# Patient Record
Sex: MALE | Race: WHITE | NOT HISPANIC OR LATINO | Employment: UNEMPLOYED | URBAN - METROPOLITAN AREA
[De-identification: names, ages, dates, MRNs, and addresses within clinical notes are randomized per-mention and may not be internally consistent; named-entity substitution may affect disease eponyms.]

---

## 2018-11-15 ENCOUNTER — TELEPHONE (OUTPATIENT)
Dept: OTHER | Facility: CLINIC | Age: 38
End: 2018-11-15

## 2022-10-30 ENCOUNTER — HOSPITAL ENCOUNTER (EMERGENCY)
Facility: CLINIC | Age: 42
Discharge: HOME | End: 2022-10-30
Attending: EMERGENCY MEDICINE | Admitting: EMERGENCY MEDICINE
Payer: COMMERCIAL

## 2022-10-30 VITALS
BODY MASS INDEX: 27.28 KG/M2 | HEIGHT: 68 IN | WEIGHT: 180 LBS | HEART RATE: 123 BPM | OXYGEN SATURATION: 100 % | TEMPERATURE: 97 F | SYSTOLIC BLOOD PRESSURE: 167 MMHG | RESPIRATION RATE: 18 BRPM | DIASTOLIC BLOOD PRESSURE: 111 MMHG

## 2022-10-30 DIAGNOSIS — F41.9 ANXIETY: ICD-10-CM

## 2022-10-30 LAB
ALCOHOL BREATH TEST: 0.14 (ref 0–0.01)
AMPHETAMINES UR QL SCN: ABNORMAL
BARBITURATES UR QL SCN: ABNORMAL
BENZODIAZ UR QL SCN: ABNORMAL
BZE UR QL SCN: ABNORMAL
CANNABINOIDS UR QL SCN: ABNORMAL
OPIATES UR QL SCN: ABNORMAL
PCP QUAL URINE (ROCHE): ABNORMAL

## 2022-10-30 PROCEDURE — 80307 DRUG TEST PRSMV CHEM ANLYZR: CPT | Performed by: FAMILY MEDICINE

## 2022-10-30 PROCEDURE — 250N000011 HC RX IP 250 OP 636: Performed by: EMERGENCY MEDICINE

## 2022-10-30 PROCEDURE — 99285 EMERGENCY DEPT VISIT HI MDM: CPT | Mod: 25

## 2022-10-30 PROCEDURE — 250N000013 HC RX MED GY IP 250 OP 250 PS 637: Performed by: EMERGENCY MEDICINE

## 2022-10-30 PROCEDURE — 90791 PSYCH DIAGNOSTIC EVALUATION: CPT

## 2022-10-30 PROCEDURE — 99284 EMERGENCY DEPT VISIT MOD MDM: CPT | Performed by: EMERGENCY MEDICINE

## 2022-10-30 PROCEDURE — 96372 THER/PROPH/DIAG INJ SC/IM: CPT | Performed by: EMERGENCY MEDICINE

## 2022-10-30 RX ORDER — HALOPERIDOL 5 MG/ML
2 INJECTION INTRAMUSCULAR
Status: COMPLETED | OUTPATIENT
Start: 2022-10-30 | End: 2022-10-30

## 2022-10-30 RX ORDER — LORAZEPAM 1 MG/1
2 TABLET ORAL ONCE
Status: COMPLETED | OUTPATIENT
Start: 2022-10-30 | End: 2022-10-30

## 2022-10-30 RX ADMIN — LORAZEPAM 2 MG: 1 TABLET ORAL at 19:00

## 2022-10-30 RX ADMIN — HALOPERIDOL LACTATE 2 MG: 5 INJECTION, SOLUTION INTRAMUSCULAR at 19:22

## 2022-10-30 ASSESSMENT — ACTIVITIES OF DAILY LIVING (ADL)
ADLS_ACUITY_SCORE: 35

## 2022-10-30 NOTE — ED TRIAGE NOTES
"Arrived via [facility] 1622. ETOH use, just wants to \" talk to someone\". Has not taken meds today. Requesting mental health evaluation. ETOH. ; /92; 96% on RA     Triage Assessment     Row Name 10/30/22 3279       Triage Assessment (Adult)    Airway WDL WDL       Respiratory WDL    Respiratory WDL WDL       Skin Circulation/Temperature WDL    Skin Circulation/Temperature WDL WDL       Cardiac WDL    Cardiac WDL WDL       Peripheral/Neurovascular WDL    Peripheral Neurovascular WDL WDL       Cognitive/Neuro/Behavioral WDL    Cognitive/Neuro/Behavioral WDL WDL              "

## 2022-10-30 NOTE — ED PROVIDER NOTES
"  History     Chief Complaint   Patient presents with     Mental Health Problem     Arrived via [facility] 1622. ETOH use, just wants to \" talk to someone\". Has not taken meds today.      Alcohol Intoxication     Requesting mental health evaluation. ETOH. ; /92; 96% on RA     HPI  Juancarlos Loera is a 41 year old male who presents with alcohol intoxication.  The patient smells of alcohol on arrival.  He is also saying that his girlfriend tried to commit suicide over the weekend.  He says he is feeling like he is having a panic attack because of that.  On my arrival to speak with the patient he is trying to leave.  He smells of alcohol so we did not let him leave.  He was quite abusive to me into the nurses and I asked him to please stop this.  He is asking for alprazolam, which he says he needs to help him calm down.  He says he also needs his mood stabilizer.  I am not sure what this is.  He says he brought it with those taken on arrival.  He wandered around the ER attempted to abscond before being redirected back to his room.  He told triage that he was requesting a mental health evaluation.  He came here via EMS.  He is telling me that he is not suicidal.    Allergies:  No Known Allergies    Problem List:    There are no problems to display for this patient.       Past Medical History:    No past medical history on file.    Past Surgical History:    No past surgical history on file.    Family History:    No family history on file.    Social History:  Marital Status:  Single [1]        Medications:    No current outpatient medications on file.        Review of Systems   Unable to perform ROS: Other   Psychiatric/Behavioral: Negative for suicidal ideas.        Intoxication  Anxiety       Physical Exam   BP: (!) 167/111  Pulse: (!) 123  Temp: 97  F (36.1  C)  Resp: 18  Height: 172.7 cm (5' 8\")  Weight: 81.6 kg (180 lb)  SpO2: 100 %      Physical Exam  Vitals reviewed.   Constitutional:       " General: He is not in acute distress.     Appearance: He is not toxic-appearing.      Comments: Abusive   HENT:      Head: Normocephalic and atraumatic.      Right Ear: External ear normal.      Left Ear: External ear normal.      Nose: No congestion.      Mouth/Throat:      Pharynx: No oropharyngeal exudate.   Eyes:      General:         Right eye: No discharge.         Left eye: No discharge.      Extraocular Movements: Extraocular movements intact.   Pulmonary:      Effort: No respiratory distress.   Abdominal:      General: Abdomen is flat. There is no distension.   Musculoskeletal:         General: No swelling.      Cervical back: No rigidity.   Skin:     Capillary Refill: Capillary refill takes less than 2 seconds.      Coloration: Skin is not jaundiced.   Neurological:      General: No focal deficit present.      Cranial Nerves: No cranial nerve deficit.   Psychiatric:      Comments: Anxious  Denying SI         ED Course              ED Course as of 11/06/22 0130   Sun Oct 30, 2022   1855 Urine toxicology is positive for cannabinoids.   1935 The patient just threatened to assault me if he ever sees me at [facility].   2009 Lora from DEC called. This consult was placed apparently on arrival. He denied SI. Says he drinks once or two times a week. No previous suicide attempts. Would recommend reassessment once sober if patient is suicidal when sober, otherwise doesn't need it. Lora is putting together a safety plan. I shared my opinion that the patient's narcissism is likely protective.    2102 Breathalyzer shows SURI of 0.136   2257 Patient is now clinically sober. Is getting a ride home. Denies SI, HI, AVH. No tremors. Looks well.    2257 He would like to be discharged.    2258 I gave ER precautions. Refused a sandwich. I'm going to discharge him at this time. Walking normally, mentating normally.      Procedures                  No results found for this or any previous visit (from the past 24  hour(s)).    Medications   LORazepam (ATIVAN) tablet 2 mg (2 mg Oral Given 10/30/22 1900)   haloperidol lactate (HALDOL) injection 2 mg (2 mg Intramuscular Given 10/30/22 1922)       Assessments & Plan (with Medical Decision Making)     I am not totally sure what is going on exactly with this patient though he appears to be intoxicated.  Therefore I cannot let him leave.  I am going to keep him here until he is clinically sober.  I Kathy give him some Ativan to help him calm down.  This should also help stabilize his mood.  I will reassess him after he is clinically sober.  I will defer a mental health evaluation till he is clinically sober.    I have reviewed the nursing notes.    I have reviewed the findings, diagnosis, plan and need for follow up with the patient.    This note was in part created using dictation software in an effort to speed and improve patient care; any typos should be read with the frequent shortcomings of this technology in mind.      There are no discharge medications for this patient.      Final diagnoses:   Anxiety       10/30/2022   [facility] EMERGENCY DEPT     Joey Lemos MD  11/06/22 0130

## 2022-10-31 NOTE — CONSULTS
"Diagnostic Evaluation Consultation  Crisis Assessment    Patient was assessed: Wilman  Patient location: [facility]   Was a release of information signed: No. Reason: pt did not want to relay this information       Referral Data and Chief Complaint  Juancarlos is a 41 year old, who uses he/him pronouns, and presents to the ED via EMS. Patient is referred to the ED by nurse triage after he called with suicidal ideation. Patient is presenting to the ED for the following concerns:  Alcohol intoxication and suicidal ideation    Informed Consent and Assessment Methods    Patient is his own guardian. Writer met with patient and explained the crisis assessment process, including applicable information disclosures and limits to confidentiality, assessed understanding of the process, and obtained consent to proceed with the assessment. Patient was observed to be able to participate in the assessment as evidenced by answer most of my questions. Assessment methods included conducting a formal interview with patient, review of medical records, collaboration with medical staff, and obtaining relevant collateral information from family and community providers when available..     Over the course of this crisis assessment provided reassurance, offered validation and assisted in processing patient's thoughts and feeling relating to break up with girlfriend. Patient's response to interventions was: irritable, begrudgingly answered questions.      Summary of Patient Situation  Pt is a 42 yo male who presents with alcohol intoxication.  He called a nurse triage line today saying he needed a mental health evaluation and \"wanted to check himself in.\"  He was saying things that made them concerned for his safety so they did a well check.  EMS brought him in to the ED.  Upon arrival he smelled of alcohol and was combative and verbally abusive to hospital staff. He told the ED MD that he was going to find him at [facility] and " "kill him.  He attempted to leave the hospital many times so he was placed on a hold.  ED provider reports that the pt told him he was having a panic attack as well.     When asked why he came into the hospital he said, \"I called 911\" and \"I felt unsafe.\"  Writer told him I was glad that he reached out for help.  He is telling writer that he is not suicidal and that he was \"just drunk.\"  He said he \"just wants to go to sleep and break up with his girlfriend and he'll be fine.\"      Pt does still seem intoxicated and he was not willing to do a UA to determine what level of intoxication he is at.  He is often laying down facing away from writer, irritated with line of questions. He asked the interview to be over multiple times.  He often sighed and groaned in annoyance when I would ask a question.  His words were somewhat slurred and he did not want to engage with writer.    Brief Psychosocial History  Pt reports that he lives alone currently.  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  He has 5 children ages, 17yo, 14yo, 7yo, 5yo and 6yo.  He said none of them reside with him but with their mothers.  Pt says he and his girlfriend have been fighting lately and he just wants to break up with her.     Significant Clinical History  Pt denies any hx of MH admissions or detox.  He denies any hx of suicide attempts.  He does see a therapist at [facility].  He says he has seen her for 2 years.  No other MH providers.  He reports that he has a girlfriend, but that things are not going well between them.  He reports that she attempted suicide this weekend. He got drunk today and didn't know how to cope with his emotions and was having a panic attack.      Collateral Information  Pt was not cooperative. Pt did not want anyone contacted.  He had no emergency contacts listed in his chart.   Collateral provided by ED provider.      Risk Assessment  ESS-6  1.a. Over the past 2 weeks, have you had thoughts of killing " "yourself? Yes  1.b. Have you ever attempted to kill yourself and, if yes, when did this last happen? No   2. Recent or current suicide plan? No   3. Recent or current intent to act on ideation? No  4. Lifetime psychiatric hospitalization? No  5. Pattern of excessive substance use? Yes  6. Current irritability, agitation, or aggression? Yes  Scoring note: BOTH 1a and 1b must be yes for it to score 1 point, if both are not yes it is zero. All others are 1 point per number. If all questions 1a/1b - 6 are no, risk is negligible. If one of 1a/1b is yes, then risk is mild. If either question 2 or 3, but not both, is yes, then risk is automatically moderate regardless of total score. If both 2 and 3 are yes, risk is automatically high regardless of total score.      Score: 2, mild risk      Does the patient have access to lethal means? Pt did not answer 'none of my business'     Does the patient engage in non-suicidal self-injurious behavior (NSSI/SIB)? no     Does the patient have thoughts of harming others? No     Is the patient engaging in sexually inappropriate behavior?  no        Current Substance Abuse     Is there recent substance abuse? Pt was intoxicated today and said he \"drank 5 beers.\"  Would not provider urine for BAL.     Was a urine drug screen or blood alcohol level obtained: No       Mental Status Exam     Affect: Dramatic and Labile   Appearance: Disheveled    Attention Span/Concentration: Inattentive  Eye Contact: Avoidant   Fund of Knowledge: Other: hard to assess as he was not cooperative    Language /Speech Content: irritable, annoyed   Language /Speech Volume: Loud    Language /Speech Rate/Productions: Normal    Recent Memory: Variable   Remote Memory: Variable   Mood: Angry and Irritable    Orientation to Person: Yes    Orientation to Place: Yes   Orientation to Time of Day: Yes    Orientation to Date: Yes    Situation (Do they understand why they are here?): Yes    Psychomotor Behavior: moving " around a lot on bed, faced his back to me multiple times as he did not want to engage     Thought Content: Clear and Suicidal   Thought Form: Intact      History of commitment: No    Medication    Psychotropic medications: none  Medication changes made in the last two weeks: no       Current Care Team    Primary Care Provider: None  Psychiatrist: None  Therapist: [facility]  : No     CTSS or [facility]HS: None  ACT Team: None  Other: None      Diagnosis    Substance-Related & Addictive Disorders Alcohol Intoxication  303.00 (F10.929) Alcohol Intoxication without use disorder   300.00 (F41.9) Unspecified Anxiety Disorder - primary       Clinical Summary and Substantiation of Recommendations    Pt presents due to alcohol intoxication after calling a nurse triage line.  Upon arrival he is combative, irritable and mostly uncooperative.  He is sobering up and now denying suicidal ideation. However, at the time of the assessment writer spoke to ED provider and relayed that it would be best for this patient to have additional time to sober before final decision is made for discharge. He will likely be able to discharge home with outpatient follow-up, but due to his intoxication and inability to completely engage in assessment writer recommended a reassessment in a few hours.  Then the  can further safety plan with him and work out what is best for final decision. ED provider agrees.  He will see if another assessment is needed upon sobering.   Pt was alert and oriented. Casually groomed, disheveled.  He was laying on the bed but many times turned with his back to writer.  He did not want to do the interview and was irritable with the questions.  Asking when it can end multiple times.  He had avoidant eye contact. Somewhat slurred speech. Mood was depressed, and affect was irritable.  Overall thought process was clear. He denies current SI.  He denies any plan or intent for  suicide.  Denies HI and SIB. No psychosis noted. No hallucinations. No delusions and no paranoia.  Insight was poor. Judgment was poor.   Recommendation is for pt to sober and reassessment be completed.  Disposition    Recommended disposition: TBD       Reviewed case and recommendations with attending provider. Attending Name: Joey Lemos MD     Attending concurs with disposition: yes for reassessment upon sobering or if MD feels discharge is appropriate once sober that would likely be okay as well as pt is not endorsing SI       Patient concurs with disposition: NA       Guardian concurs with disposition: NA      Final disposition: Other: reassessment upon sobering.     Outpatient Details (if applicable):   Aftercare plan and appointments placed in the AVS and provided to patient: Provided resources of Rule 25 in area, follow-up with his OP therapist as well     Was lethal means counseling provided as a part of aftercare planning? No;       Assessment Details    Patient interview started at: 7:45pm and completed at: 8pm .     Total duration spent on the patient case in minutes: 1.50 hrs      CPT code(s) utilized: 56366 - Psychotherapy for Crisis - 60 (30-74*) min       TAMEKA Pandey, DONAC,LM  [facility] - Triage & Transition Services  Callback: [phone]

## 2022-10-31 NOTE — DISCHARGE INSTRUCTIONS
Recommendation and Safety Plan for: Juancarlos Loera  : 1980    Recommendation for Follow-up care:  Recommendation for care is for you to follow-up with your outpatient therapist on a weekly basis.  You are followed by a provider at [facility].  We recommend you see her this week.    We also recommend a chemical dependency assessment if you continue to struggle with your alcohol intake.  [facility] has Rule 25/CD assessments available.  Please see below.     [facility]  We provide connections to services and resources that will put you on a path to healing. Contact us for help in crisis, to get an assessment, or to access ongoing mental health or chemical health services.    - Crisis services    Walk-in crisis services  [address] [place] [address]  Monday-Friday (8 a.m.-7 p.m.)  Saturday (11 a.m.-3 p.m.)   Detox Center    Substance use disorders are a chronic disease that results from biological, social and environmental factors. Addiction can be successfully treated. The [facility] Detoxification Center is a place that helps individuals safely manage symptoms of intoxication or withdrawal for drugs and alcohol, whilel providing counseling and support services that treat the whole person, not just their substance use. The Detox Center is not a dention center, correctional facility, or law enforcement center.    [address] [place] [address]    [phone]    The Chemical Health Unit provides drug and alcohol assessments for [facility] residents. The unit also is an access point for state-licensed, chemical dependency services. After the assessment, the Chemical Health Unit may refer people who are chemically dependent to group residential housing or other community programs.    To qualify for financial assistance to attend a chemical dependency program, a person must meet state income guidelines and clinical eligibility  guidelines. For more information, call [phone].    ---It is your responsibility to contact your insurance company directly to verify coverage, eligibility, payment, and benefit information for any appointments or referrals listed above.     *A BHP (Behavioral Healthcare Provider) Coordinator will be reaching out to you with 24-48 hours to assist with additional appointments if needed.      Please call BHP at [phone] for additional scheduling assistance.      We discussed a safety plan that will work for you as well. Here are additional resources:     Safety Plan      *If I Am Having Difficulty Or Am In A Crisis, I Will:       ? Contact my established care providers       ? Call Suicide Hotline (2-953-173-DXLX)       ? Go to the nearest Emergency Department       ? Call 9-1-1       Warnings Signs That A Crisis May Be Developing:     I am having suicidal thoughts that turn to plans  I am having urges to self-harm  My emotions are of hopelessness; feeling like there's no way out  Rage or anger  Engaging in risky activities without thinking  Withdrawing from family/friends  Dramatic mood swings  Drastic personality changes   Use of alcohol or drugs  Postings on social media  Neglect of personal hygiene or cares      Things That Make Me Feel Better:            Family, music, friends, exercise, relaxation breathing  Physical location,   safe people,   imagery etc.     Phone Apps that can be helpful!!   My3  https://myEmulispp.org/  VirtualHopeBox  https://InRoom Broadcasting.org/apps/virtual-hope-box/  Calm Harm (michael to assist with preventing self-injury)  Suicide Safety Plan LLC - https://www.suicidesafetyplan.michael/      Mindshift CBT Michael       Things I am able to do on my own to cope or help me feel better: Grounding Techniques:  Try to notice where you are, your surroundings including the people, the sounds like the TV or radio.  Concentrate on your breathing. Take a deep cleansing breath from your diaphragm. Count the  breaths as you exhale. Make sure you breath slowly.  Hold something that you find comforting, for some it may be a stuffed animal or a blanket. Notice how it feels in your hands. Is it hard or soft?  During a non-crisis time make a list of positive affirmations. Print them out and keep them handy for times of intense anxiety. At those times, read them aloud.  Try the Money360 game:  Name 5 things you can see in the room with you  Name 4 things you can feel ( chair on my back  or  feet on floor )   Name 3 things you can hear right now ( people talking  or  tv )   Name 2 things you can smell right now (or, 2 things you like the smell of)   Name 1 good thing about yourself  Create A Safe Place  Image a safe place -- it can be a real or imaginary place:   What do you see -- especially colors?   What sounds do you hear?   What sensations do you feel?   What smells do you smell?   What people or animals would you want in your safe place?   Imagine a protective bubble, wall or boundary around your safe place.   Imagine a door or gate with a guard at your safe place.   Image a lock and key to your safe place and only you can unlock it.  You can draw or make a collage that represents your safe place.   Choose a souvenir of your safe place -- a color, an object, a song.   Keep your image of your safe place so you can come back to it when you need to.     Things I can use or do for distraction: Reduce Extreme Emotion  QUICKLY:  Changing Your Body Chemistry      T:  Change your body Temperature to change your autonomic nervous system   Use Ice Water to calm yourself down FAST   Put your face in a bowl of ice water (this is the best way; have the person keep his/her face in ice water for 30-45 seconds - initial research is showing that the longer s/he can hold her/his face in the water, the better the response), or   Splash ice water on your face, or hold an ice pack on your face      I:  Intensely exercise to calm down a body  revved up by emotion   Examples: running, walking fast, jumping, playing basketball, weight lifting, swimming, calisthenics, etc.   Engage in exercises that DO NOT include violent behaviors. Exercises that utilize violent behaviors tend to function as  behavioral rehearsal,  and rather than calming the person down, may actually  rev  the person up more, increasing the likelihood of violence, and lessening the likelihood that they will  burn off  energy     P:  Progressively relax your muscles   Starting with your hands, moving to your forearms, upper arms, shoulders, neck, forehead, eyes, cheeks and lips, tongue and teeth, chest, upper back, stomach, buttocks, thighs, calves, ankles, feet   Tense (10 seconds,   of the way), then relax each muscle (all the way)   Notice the tension   Notice the difference when relaxed (by tensing first, and then relaxing, you are able to get a more thorough relaxation than by simply relaxing)      P: Paced breathing to relax   The standard technique is to begin with counting the number of steps one takes for a typical inhale, then counting the steps one takes for a typical exhale, and then lengthening the amount of steps for the exhalation by one or two steps.  OR  Repeat this pattern for 1-2 minutes  Inhale for four (4) seconds   Exhale for six (6) to eight (8) seconds   Research demonstrated that one can change one's overall level of anxiety by doing this exercise for even a few minutes per day        Changes I can make to support my mental health and wellness: refrain from drinking     People in my life that I can ask for help: family, friends, my therapist      Your [facility] has a mental health crisis team you can call 24/7:   - [facility] Crisis  Crisis Line  If you are in crisis, please call the 24/7/365 crisis line at [phone].    - [facility]  [address] [place], [place] [address]  [phone]    Monday - Friday   8 a.m. - 5:30  "p.m.  Closed: Saturdays, Sundays and county holidaysB    Getting There   Visit [employer] s website or call [phone] to find connections to the #64 bus, which stops at the corner of [facility] and [facility], just two blocks east of [facility].    Building Policies   Walk-ins welcome    Additional Information   [facility] provides service to adults who are experiencing a mental health crisis.    Learn more about adult mental health and chemical health services in [facility], including how to access services and community resources.        Other things that are important when I'm in crisis:   People and Social Settings That Provide Distraction:      My friends and family  Exercising  Listening to music  Reading a book  Watching a movie  Meditating  Calling a friend    [facility]   COPE: [facility] mental health emergencies- [phone] - They have mobile crisis teams that can come where you are.  The teams respond to anyone in the [facility] who needs an urgent response.      Texting crisis line - text [place] to 261204 - 24 hours a day 7 days a week     Fast Tracker  Linking people to mental health and substance use disorder resources  www.Solidarium.org      [place] Mental Health Warm Line  Peer to peer support  Monday thru Saturday, 12 pm to 10 pm  206.110.9641 or 3.874.403.2402  Text \"Support\" to 02844     National [place] on Mental Illness (LAVERN)  628.032.3031 or 1.888.LAVERN.HELPS      *Panic Attack resources  --Here are some additional resources---  https://adaa.org/understanding-anxiety/panic-disorder/resources  https://projectlets.org/emergency-action-for-panic-attacks    *Progressive Muscle Relaxation  Https://www.youtube.com/watch?v=ClqPtWzozXs    Crisis Lines  Crisis Text Line  Text 067864  You will be connected with a trained live crisis counselor to provide support.    Por darius, rosaso  KALLIE a 194410 o texto a " "442-ASTRID Patino    [employer] [facility] (LGBTQ Youth Crisis Line)  4.749.475.9999  text START to 631-509    Community Resources  Fast Tracker  Linking people to mental health and substance use disorder resources  PurpleTeal.Omni-ID     [place] Mental Health Warm Line  Peer to peer support  Monday thru Saturday, 12 pm to 10 pm  639.614.2914 or 2.474.371.1555  Text \"Support\" to 82482    National [place] on Mental Illness (LAVERN)  964.947.6807 or 1.888.LAVERN.HELPS    Additional information  Today you were seen by a licensed mental health professional through Triage and Transition services, Behavioral Healthcare Providers ([facility])  for a crisis assessment in the Emergency Department at [facility].  It is recommended that you follow up with your established providers (psychiatrist, mental health therapist, and/or primary care doctor - as relevant) as soon as possible. Coordinators from [facility] will be calling you in the next 24-48 hours to ensure that you have the resources you need.  You can also contact [facility] coordinators directly at [phone].     You may have been scheduled for or offered an appointment with a mental health provider. [facility] maintains an extensive network of licensed behavioral health providers to connect patients with the services they need.  We do not charge providers a fee to participate in our referral network.  We match patients with providers based on a patient's specific needs, insurance coverage, and location.  Our first effort will be to refer you to a provider within your care system, and will utilize providers outside your care system as needed.             "

## 2022-10-31 NOTE — ED NOTES
Patient's girlfriend updated on patient status. Girlfriend will pick patient up when he is able to discharge.

## 2022-10-31 NOTE — ED NOTES
Pt attempted to leave the department. MD at [facility], security at bedside. Pt is slurring speech, angry and swearing at staff.

## 2022-10-31 NOTE — ED NOTES
"With security remaining at bedside, patient stated that he \" wants to shoot my brains out\" Belongings searched, vap pen removed from patient. Health officer hold paperwork given to patient. Cameras remain in place.   "

## 2022-11-11 ENCOUNTER — HOSPITAL ENCOUNTER (EMERGENCY)
Facility: CLINIC | Age: 42
Discharge: HOME | End: 2022-11-11
Attending: FAMILY MEDICINE | Admitting: FAMILY MEDICINE
Payer: COMMERCIAL

## 2022-11-11 ENCOUNTER — APPOINTMENT (OUTPATIENT)
Dept: GENERAL RADIOLOGY | Facility: CLINIC | Age: 42
End: 2022-11-11
Attending: FAMILY MEDICINE
Payer: COMMERCIAL

## 2022-11-11 VITALS
DIASTOLIC BLOOD PRESSURE: 99 MMHG | SYSTOLIC BLOOD PRESSURE: 147 MMHG | OXYGEN SATURATION: 97 % | HEIGHT: 68 IN | WEIGHT: 177 LBS | TEMPERATURE: 98.6 F | RESPIRATION RATE: 18 BRPM | BODY MASS INDEX: 26.83 KG/M2 | HEART RATE: 82 BPM

## 2022-11-11 DIAGNOSIS — R07.81 PLEURITIC CHEST PAIN: ICD-10-CM

## 2022-11-11 DIAGNOSIS — R03.0 ELEVATED BP WITHOUT DIAGNOSIS OF HYPERTENSION: ICD-10-CM

## 2022-11-11 PROBLEM — J45.20 MILD INTERMITTENT ASTHMA WITHOUT COMPLICATION: Status: ACTIVE | Noted: 2021-05-05

## 2022-11-11 LAB
ALBUMIN SERPL BCG-MCNC: 5 G/DL (ref 3.5–5.2)
ALP SERPL-CCNC: 44 U/L (ref 40–129)
ALT SERPL W P-5'-P-CCNC: 14 U/L (ref 10–50)
ANION GAP SERPL CALCULATED.3IONS-SCNC: 13 MMOL/L (ref 7–15)
AST SERPL W P-5'-P-CCNC: 33 U/L (ref 10–50)
BASOPHILS # BLD AUTO: 0.1 10E3/UL (ref 0–0.2)
BASOPHILS NFR BLD AUTO: 1 %
BILIRUB DIRECT SERPL-MCNC: <0.2 MG/DL (ref 0–0.3)
BILIRUB SERPL-MCNC: 0.8 MG/DL
BUN SERPL-MCNC: 10.6 MG/DL (ref 6–20)
CALCIUM SERPL-MCNC: 9.9 MG/DL (ref 8.6–10)
CHLORIDE SERPL-SCNC: 100 MMOL/L (ref 98–107)
CREAT SERPL-MCNC: 1.02 MG/DL (ref 0.67–1.17)
D DIMER PPP FEU-MCNC: 0.46 UG/ML FEU (ref 0–0.5)
DEPRECATED HCO3 PLAS-SCNC: 27 MMOL/L (ref 22–29)
EOSINOPHIL # BLD AUTO: 0.1 10E3/UL (ref 0–0.7)
EOSINOPHIL NFR BLD AUTO: 2 %
ERYTHROCYTE [DISTWIDTH] IN BLOOD BY AUTOMATED COUNT: 12.7 % (ref 10–15)
FLUAV RNA SPEC QL NAA+PROBE: NEGATIVE
FLUBV RNA RESP QL NAA+PROBE: NEGATIVE
GFR SERPL CREATININE-BSD FRML MDRD: >90 ML/MIN/1.73M2
GLUCOSE SERPL-MCNC: 105 MG/DL (ref 70–99)
HCT VFR BLD AUTO: 50.2 % (ref 40–53)
HGB BLD-MCNC: 17.2 G/DL (ref 13.3–17.7)
HOLD SPECIMEN: NORMAL
IMM GRANULOCYTES # BLD: 0 10E3/UL
IMM GRANULOCYTES NFR BLD: 0 %
LIPASE SERPL-CCNC: 40 U/L (ref 13–60)
LYMPHOCYTES # BLD AUTO: 1.6 10E3/UL (ref 0.8–5.3)
LYMPHOCYTES NFR BLD AUTO: 21 %
MCH RBC QN AUTO: 31 PG (ref 26.5–33)
MCHC RBC AUTO-ENTMCNC: 34.3 G/DL (ref 31.5–36.5)
MCV RBC AUTO: 91 FL (ref 78–100)
MONOCYTES # BLD AUTO: 1.1 10E3/UL (ref 0–1.3)
MONOCYTES NFR BLD AUTO: 15 %
NEUTROPHILS # BLD AUTO: 4.7 10E3/UL (ref 1.6–8.3)
NEUTROPHILS NFR BLD AUTO: 61 %
NRBC # BLD AUTO: 0 10E3/UL
NRBC BLD AUTO-RTO: 0 /100
PLATELET # BLD AUTO: 321 10E3/UL (ref 150–450)
POTASSIUM SERPL-SCNC: 4.3 MMOL/L (ref 3.4–5.3)
PROT SERPL-MCNC: 8 G/DL (ref 6.4–8.3)
RBC # BLD AUTO: 5.54 10E6/UL (ref 4.4–5.9)
SARS-COV-2 RNA RESP QL NAA+PROBE: NEGATIVE
SODIUM SERPL-SCNC: 140 MMOL/L (ref 136–145)
WBC # BLD AUTO: 7.7 10E3/UL (ref 4–11)

## 2022-11-11 PROCEDURE — 83690 ASSAY OF LIPASE: CPT | Performed by: FAMILY MEDICINE

## 2022-11-11 PROCEDURE — 71046 X-RAY EXAM CHEST 2 VIEWS: CPT

## 2022-11-11 PROCEDURE — 99285 EMERGENCY DEPT VISIT HI MDM: CPT | Mod: CS,25 | Performed by: FAMILY MEDICINE

## 2022-11-11 PROCEDURE — 87636 SARSCOV2 & INF A&B AMP PRB: CPT | Performed by: FAMILY MEDICINE

## 2022-11-11 PROCEDURE — 85379 FIBRIN DEGRADATION QUANT: CPT | Performed by: FAMILY MEDICINE

## 2022-11-11 PROCEDURE — 93005 ELECTROCARDIOGRAM TRACING: CPT | Performed by: FAMILY MEDICINE

## 2022-11-11 PROCEDURE — 93010 ELECTROCARDIOGRAM REPORT: CPT | Performed by: FAMILY MEDICINE

## 2022-11-11 PROCEDURE — 250N000011 HC RX IP 250 OP 636: Performed by: FAMILY MEDICINE

## 2022-11-11 PROCEDURE — 36415 COLL VENOUS BLD VENIPUNCTURE: CPT | Performed by: FAMILY MEDICINE

## 2022-11-11 PROCEDURE — 80053 COMPREHEN METABOLIC PANEL: CPT | Performed by: FAMILY MEDICINE

## 2022-11-11 PROCEDURE — 258N000003 HC RX IP 258 OP 636: Performed by: FAMILY MEDICINE

## 2022-11-11 PROCEDURE — 96374 THER/PROPH/DIAG INJ IV PUSH: CPT | Performed by: FAMILY MEDICINE

## 2022-11-11 PROCEDURE — 85025 COMPLETE CBC W/AUTO DIFF WBC: CPT | Performed by: FAMILY MEDICINE

## 2022-11-11 PROCEDURE — 82248 BILIRUBIN DIRECT: CPT | Performed by: FAMILY MEDICINE

## 2022-11-11 PROCEDURE — C9803 HOPD COVID-19 SPEC COLLECT: HCPCS | Performed by: FAMILY MEDICINE

## 2022-11-11 PROCEDURE — 99284 EMERGENCY DEPT VISIT MOD MDM: CPT | Mod: CS | Performed by: FAMILY MEDICINE

## 2022-11-11 PROCEDURE — 96361 HYDRATE IV INFUSION ADD-ON: CPT | Performed by: FAMILY MEDICINE

## 2022-11-11 RX ORDER — FLUTICASONE PROPIONATE 50 MCG
2 SPRAY, SUSPENSION (ML) NASAL
COMMUNITY
Start: 2022-02-02

## 2022-11-11 RX ORDER — ALBUTEROL SULFATE 90 UG/1
1-2 AEROSOL, METERED RESPIRATORY (INHALATION)
COMMUNITY
Start: 2022-03-18

## 2022-11-11 RX ORDER — GABAPENTIN 100 MG/1
CAPSULE ORAL
COMMUNITY
Start: 2022-06-29

## 2022-11-11 RX ORDER — KETOROLAC TROMETHAMINE 15 MG/ML
15 INJECTION, SOLUTION INTRAMUSCULAR; INTRAVENOUS ONCE
Status: COMPLETED | OUTPATIENT
Start: 2022-11-11 | End: 2022-11-11

## 2022-11-11 RX ORDER — LAMOTRIGINE 100 MG/1
TABLET ORAL
COMMUNITY
Start: 2021-12-14

## 2022-11-11 RX ORDER — ALPRAZOLAM 1 MG
TABLET ORAL
COMMUNITY
Start: 2022-02-16

## 2022-11-11 RX ORDER — QUETIAPINE FUMARATE 50 MG/1
50 TABLET, FILM COATED ORAL AT BEDTIME
COMMUNITY
Start: 2022-10-29

## 2022-11-11 RX ADMIN — SODIUM CHLORIDE 500 ML: 9 INJECTION, SOLUTION INTRAVENOUS at 15:48

## 2022-11-11 RX ADMIN — KETOROLAC TROMETHAMINE 15 MG: 15 INJECTION, SOLUTION INTRAMUSCULAR; INTRAVENOUS at 18:26

## 2022-11-11 ASSESSMENT — ACTIVITIES OF DAILY LIVING (ADL)
ADLS_ACUITY_SCORE: 35
ADLS_ACUITY_SCORE: 35

## 2022-11-11 NOTE — ED PROVIDER NOTES
"  History     Chief Complaint   Patient presents with     Cough     Last week had a cough and right sided chest pain with deep breath     Vomiting     Vomiting started on Wednesday      HPI  Juancarlos Loera is a 41 year old male who presents with a history of vomiting and cough right-sided chest pain with deep breathing.  No obvious VTE risk.  Has been having vomiting nonbilious nonbloody.  Also was experiencing a cough that was nonproductive.  No left-sided chest pain.  No exertional pain.  No trauma.  No blood in the stool or black tarry stools.  No associated fever.  Does have superimposed anxiety as well.      Allergies:  No Known Allergies    Problem List:    There are no problems to display for this patient.       Past Medical History:    No past medical history on file.    Past Surgical History:    No past surgical history on file.    Family History:    No family history on file.    Social History:  Marital Status:  Single [1]        Medications:    No current outpatient medications on file.        Review of Systems    ROS:  5 point ROS negative except as noted above in HPI, including Gen., Resp., CV, GI &  system review.    Physical Exam   BP: (!) 148/105  Pulse: 109  Temp: 98.6  F (37  C)  Resp: 18  Height: 172.7 cm (5' 8\")  Weight: 80.3 kg (177 lb) (stated)  SpO2: 96 %      Physical Exam  Constitutional:       General: He is in acute distress.      Appearance: He is not diaphoretic.   HENT:      Head: Atraumatic.   Eyes:      Conjunctiva/sclera: Conjunctivae normal.   Cardiovascular:      Rate and Rhythm: Normal rate and regular rhythm.      Heart sounds: No murmur heard.  Pulmonary:      Effort: No respiratory distress.      Breath sounds: No stridor. No wheezing or rhonchi.   Chest:      Chest wall: No tenderness.   Abdominal:      General: Abdomen is flat. There is no distension.      Palpations: There is no mass.      Tenderness: There is no abdominal tenderness. There is no guarding. "   Musculoskeletal:      Right lower leg: No edema.      Left lower leg: No edema.   Skin:     Coloration: Skin is not pale.      Findings: No rash.   Neurological:      Mental Status: He is alert.      Motor: No weakness.           ED Course                 Procedures                EKG Interpretation:      Interpreted by Que Lenz MD  EKG done at 1802 hrs. demonstrates a sinus rhythm 72 bpm normal axis.  There is no ST change.  No T wave changes.  Normal R progression no Q waves.  Normal intervals.  Normal conduction.  No ectopy.  Impression sinus rhythm 72 bpm no acute change  none  Critical Care time:  none               Results for orders placed or performed during the hospital encounter of 11/11/22 (from the past 24 hour(s))   [place] Draw    Narrative    The following orders were created for panel order [place] Draw.  Procedure                               Abnormality         Status                     ---------                               -----------         ------                     Extra Blue Top Tube[[id number]]                              In process                 Extra Red Top Tube[[id number]]                               In process                 Extra Green Top (Lithium...[[id number]]                      In process                 Extra Purple Top Tube[[id number]]                            In process                   Please view results for these tests on the individual orders.   Symptomatic; Yes; 11/7/2022 Influenza A/B & SARS-CoV2 (COVID-19) Virus PCR Multiplex Nose    Specimen: Nose; Swab   Result Value Ref Range    Influenza A PCR Negative Negative    Influenza B PCR Negative Negative    SARS CoV2 PCR Negative Negative    Narrative    Testing was performed using the camille SARS-CoV-2 & Influenza A/B Assay on the camille Zaida System. This test should be ordered for the detection of SARS-CoV-2 and influenza viruses in individuals who meet clinical and/or epidemiological criteria. Test  performance is unknown in asymptomatic patients. This test is for in vitro diagnostic use under the FDA EUA for laboratories certified under CLIA to perform moderate and/or high complexity testing. This test has not been FDA cleared or approved. A negative result does not rule out the presence of PCR inhibitors in the specimen or target RNA in concentration below the limit of detection for the assay. If only one viral target is positive but coinfection with multiple targets is suspected, the sample should be re-tested with another FDA cleared, approved or authorized test, if coinfection would change clinical management. [facility] are certified under the Clinical Laboratory Improvement Amendments of 1988 (CLIA-88) as qualified to perform moderate and/or high complexity laboratory testing.   Basic metabolic panel   Result Value Ref Range    Sodium 140 136 - 145 mmol/L    Potassium 4.3 3.4 - 5.3 mmol/L    Chloride 100 98 - 107 mmol/L    Carbon Dioxide (CO2) 27 22 - 29 mmol/L    Anion Gap 13 7 - 15 mmol/L    Urea Nitrogen 10.6 6.0 - 20.0 mg/dL    Creatinine 1.02 0.67 - 1.17 mg/dL    Calcium 9.9 8.6 - 10.0 mg/dL    Glucose 105 (H) 70 - 99 mg/dL    GFR Estimate >90 >60 mL/min/1.73m2   CBC with platelets, differential    Narrative    The following orders were created for panel order CBC with platelets, differential.  Procedure                               Abnormality         Status                     ---------                               -----------         ------                     CBC with platelets and d...[[id number]]                      Final result                 Please view results for these tests on the individual orders.   CBC with platelets and differential   Result Value Ref Range    WBC Count 7.7 4.0 - 11.0 10e3/uL    RBC Count 5.54 4.40 - 5.90 10e6/uL    Hemoglobin 17.2 13.3 - 17.7 g/dL    Hematocrit 50.2 40.0 - 53.0 %    MCV 91 78 - 100 fL    MCH 31.0 26.5 - 33.0 pg    MCHC 34.3  31.5 - 36.5 g/dL    RDW 12.7 10.0 - 15.0 %    Platelet Count 321 150 - 450 10e3/uL    % Neutrophils 61 %    % Lymphocytes 21 %    % Monocytes 15 %    % Eosinophils 2 %    % Basophils 1 %    % Immature Granulocytes 0 %    NRBCs per 100 WBC 0 <1 /100    Absolute Neutrophils 4.7 1.6 - 8.3 10e3/uL    Absolute Lymphocytes 1.6 0.8 - 5.3 10e3/uL    Absolute Monocytes 1.1 0.0 - 1.3 10e3/uL    Absolute Eosinophils 0.1 0.0 - 0.7 10e3/uL    Absolute Basophils 0.1 0.0 - 0.2 10e3/uL    Absolute Immature Granulocytes 0.0 <=0.4 10e3/uL    Absolute NRBCs 0.0 10e3/uL       Medications   0.9% sodium chloride BOLUS (500 mLs Intravenous New Bag 11/11/22 1785)       Assessments & Plan (with Medical Decision Making)     MDM; Juancarlosshawanda Loera is a 41 year old male who presents with cough and pleuritic chest pain vomiting periodically.  Some reflux heartburn type symptoms as well.  No bloody emesis.  No blood in the stool black tarry stools.  No VTE risk.  No exertional chest pain.  No left-sided chest pain.  Normal EKG D-dimer chest x-ray laboratory testing.  Discussed empiric management below.  Recommended follow-up with primary doctor.  Precautions given for return      I have reviewed the nursing notes.    I have reviewed the findings, diagnosis, plan and need for follow up with the patient.       New Prescriptions    No medications on file       Final diagnoses:   Pleuritic chest pain - no serious findings. no signs  of pneumonia or pulmonary embolism.  no rash.  take ibuprofen or tylenol for pain stay hydrated.  if you have GERD symptoms you could take prilsoec 20 mg orally for 4 weeks, no food 2 hours bfeore bed, 64 oz fluid per day.  avoid caffeine, alcohol.  follow-up  primary provider   Elevated BP without diagnosis of hypertension - recheck BP in clinic       11/11/2022   [facility] EMERGENCY DEPT     Que Lenz MD  11/11/22 2607

## 2022-11-12 NOTE — DISCHARGE INSTRUCTIONS
ICD-10-CM    1. Pleuritic chest pain  R07.81     no serious findings. no signs  of pneumonia or pulmonary embolism.  no rash.  take ibuprofen or tylenol for pain stay hydrated.  if you have GERD symptoms you could take prilsoec 20 mg orally for 4 weeks, no food 2 hours bfeore bed, 64 oz fluid per day.  avoid caffeine, alcohol.  follow-up  primary provider      2. Elevated BP without diagnosis of hypertension  R03.0     recheck BP in clinic

## 2023-03-11 ENCOUNTER — TELEPHONE (OUTPATIENT)
Dept: FAMILY MEDICINE | Facility: CLINIC | Age: 43
End: 2023-03-11
Payer: COMMERCIAL

## 2023-03-11 NOTE — TELEPHONE ENCOUNTER
Patient Returning Call    Reason for call:  meds for covid    Information relayed to patient:  te    Patient has additional questions:  No    What are your questions/concerns:  n\a    Okay to leave a detailed message?: Yes at Other phone number:  [phone]*

## 2023-03-13 NOTE — TELEPHONE ENCOUNTER
Left message for the patient to call the clinic. Doesn't look like this is our patient.  He is Ric.  Sobeida PAVON RN